# Patient Record
Sex: MALE | Race: WHITE | NOT HISPANIC OR LATINO | Employment: FULL TIME | URBAN - METROPOLITAN AREA
[De-identification: names, ages, dates, MRNs, and addresses within clinical notes are randomized per-mention and may not be internally consistent; named-entity substitution may affect disease eponyms.]

---

## 2024-01-20 ENCOUNTER — APPOINTMENT (EMERGENCY)
Dept: CT IMAGING | Facility: HOSPITAL | Age: 39
End: 2024-01-20

## 2024-01-20 ENCOUNTER — HOSPITAL ENCOUNTER (EMERGENCY)
Facility: HOSPITAL | Age: 39
Discharge: HOME | End: 2024-01-20
Attending: EMERGENCY MEDICINE

## 2024-01-20 VITALS
WEIGHT: 212.3 LBS | HEART RATE: 112 BPM | SYSTOLIC BLOOD PRESSURE: 151 MMHG | TEMPERATURE: 98.4 F | DIASTOLIC BLOOD PRESSURE: 84 MMHG | OXYGEN SATURATION: 95 % | RESPIRATION RATE: 20 BRPM

## 2024-01-20 DIAGNOSIS — K20.90 ESOPHAGITIS: ICD-10-CM

## 2024-01-20 DIAGNOSIS — K76.0 HEPATIC STEATOSIS: ICD-10-CM

## 2024-01-20 DIAGNOSIS — K44.9 HIATAL HERNIA: ICD-10-CM

## 2024-01-20 DIAGNOSIS — R00.0 TACHYCARDIA: ICD-10-CM

## 2024-01-20 DIAGNOSIS — R25.1 SHAKES: Primary | ICD-10-CM

## 2024-01-20 DIAGNOSIS — J18.9 PNEUMONIA: ICD-10-CM

## 2024-01-20 DIAGNOSIS — R79.89 ELEVATED LFTS: ICD-10-CM

## 2024-01-20 DIAGNOSIS — S32.010A COMPRESSION FRACTURE OF THORACOLUMBAR VERTEBRA (HCC): ICD-10-CM

## 2024-01-20 DIAGNOSIS — S22.080A COMPRESSION FRACTURE OF THORACOLUMBAR VERTEBRA (HCC): ICD-10-CM

## 2024-01-20 DIAGNOSIS — K80.20 GALL STONE: ICD-10-CM

## 2024-01-20 LAB
2HR DELTA HS TROPONIN: 0 NG/L
ALBUMIN SERPL BCP-MCNC: 4.3 G/DL (ref 3.5–5)
ALP SERPL-CCNC: 119 U/L (ref 34–104)
ALT SERPL W P-5'-P-CCNC: 173 U/L (ref 7–52)
AMPHETAMINES SERPL QL SCN: NEGATIVE
ANION GAP SERPL CALCULATED.3IONS-SCNC: 11 MMOL/L
AST SERPL W P-5'-P-CCNC: 174 U/L (ref 13–39)
ATRIAL RATE: 108 BPM
ATRIAL RATE: 122 BPM
BACTERIA UR QL AUTO: ABNORMAL /HPF
BARBITURATES UR QL: NEGATIVE
BASOPHILS # BLD AUTO: 0.05 THOUSANDS/ÂΜL (ref 0–0.1)
BASOPHILS NFR BLD AUTO: 1 % (ref 0–1)
BENZODIAZ UR QL: POSITIVE
BILIRUB SERPL-MCNC: 0.87 MG/DL (ref 0.2–1)
BILIRUB UR QL STRIP: ABNORMAL
BUN SERPL-MCNC: 6 MG/DL (ref 5–25)
CALCIUM SERPL-MCNC: 9.3 MG/DL (ref 8.4–10.2)
CARDIAC TROPONIN I PNL SERPL HS: 5 NG/L
CARDIAC TROPONIN I PNL SERPL HS: 5 NG/L
CHLORIDE SERPL-SCNC: 98 MMOL/L (ref 96–108)
CK SERPL-CCNC: 302 U/L (ref 39–308)
CLARITY UR: CLEAR
CO2 SERPL-SCNC: 24 MMOL/L (ref 21–32)
COCAINE UR QL: NEGATIVE
COLOR UR: ABNORMAL
CREAT SERPL-MCNC: 0.86 MG/DL (ref 0.6–1.3)
D DIMER PPP FEU-MCNC: 0.93 UG/ML FEU
EOSINOPHIL # BLD AUTO: 0.02 THOUSAND/ÂΜL (ref 0–0.61)
EOSINOPHIL NFR BLD AUTO: 0 % (ref 0–6)
ERYTHROCYTE [DISTWIDTH] IN BLOOD BY AUTOMATED COUNT: 14 % (ref 11.6–15.1)
ETHANOL SERPL-MCNC: <10 MG/DL
FLUAV RNA RESP QL NAA+PROBE: NEGATIVE
FLUBV RNA RESP QL NAA+PROBE: NEGATIVE
GFR SERPL CREATININE-BSD FRML MDRD: 109 ML/MIN/1.73SQ M
GLUCOSE SERPL-MCNC: 127 MG/DL (ref 65–140)
GLUCOSE UR STRIP-MCNC: NEGATIVE MG/DL
HCT VFR BLD AUTO: 45.6 % (ref 36.5–49.3)
HGB BLD-MCNC: 15.7 G/DL (ref 12–17)
HGB UR QL STRIP.AUTO: NEGATIVE
IMM GRANULOCYTES # BLD AUTO: 0.02 THOUSAND/UL (ref 0–0.2)
IMM GRANULOCYTES NFR BLD AUTO: 0 % (ref 0–2)
KETONES UR STRIP-MCNC: ABNORMAL MG/DL
LEUKOCYTE ESTERASE UR QL STRIP: NEGATIVE
LIPASE SERPL-CCNC: 34 U/L (ref 11–82)
LYMPHOCYTES # BLD AUTO: 0.7 THOUSANDS/ÂΜL (ref 0.6–4.47)
LYMPHOCYTES NFR BLD AUTO: 10 % (ref 14–44)
MCH RBC QN AUTO: 30.8 PG (ref 26.8–34.3)
MCHC RBC AUTO-ENTMCNC: 34.4 G/DL (ref 31.4–37.4)
MCV RBC AUTO: 90 FL (ref 82–98)
METHADONE UR QL: NEGATIVE
MONOCYTES # BLD AUTO: 1.04 THOUSAND/ÂΜL (ref 0.17–1.22)
MONOCYTES NFR BLD AUTO: 15 % (ref 4–12)
MUCOUS THREADS UR QL AUTO: ABNORMAL
NEUTROPHILS # BLD AUTO: 5.16 THOUSANDS/ÂΜL (ref 1.85–7.62)
NEUTS SEG NFR BLD AUTO: 74 % (ref 43–75)
NITRITE UR QL STRIP: NEGATIVE
NON-SQ EPI CELLS URNS QL MICRO: ABNORMAL /HPF
NRBC BLD AUTO-RTO: 0 /100 WBCS
OPIATES UR QL SCN: NEGATIVE
OXYCODONE+OXYMORPHONE UR QL SCN: NEGATIVE
P AXIS: 37 DEGREES
P AXIS: 39 DEGREES
PCP UR QL: NEGATIVE
PH UR STRIP.AUTO: 7.5 [PH] (ref 4.5–8)
PLATELET # BLD AUTO: 143 THOUSANDS/UL (ref 149–390)
PMV BLD AUTO: 11.6 FL (ref 8.9–12.7)
POTASSIUM SERPL-SCNC: 3.7 MMOL/L (ref 3.5–5.3)
PR INTERVAL: 158 MS
PR INTERVAL: 194 MS
PROT SERPL-MCNC: 7.8 G/DL (ref 6.4–8.4)
PROT UR STRIP-MCNC: ABNORMAL MG/DL
QRS AXIS: 13 DEGREES
QRS AXIS: 9 DEGREES
QRSD INTERVAL: 84 MS
QRSD INTERVAL: 92 MS
QT INTERVAL: 308 MS
QT INTERVAL: 358 MS
QTC INTERVAL: 438 MS
QTC INTERVAL: 479 MS
RBC # BLD AUTO: 5.09 MILLION/UL (ref 3.88–5.62)
RBC #/AREA URNS AUTO: ABNORMAL /HPF
RSV RNA RESP QL NAA+PROBE: NEGATIVE
SARS-COV-2 RNA RESP QL NAA+PROBE: NEGATIVE
SODIUM SERPL-SCNC: 133 MMOL/L (ref 135–147)
SP GR UR STRIP.AUTO: 1.01 (ref 1–1.03)
T WAVE AXIS: 52 DEGREES
T WAVE AXIS: 65 DEGREES
THC UR QL: NEGATIVE
TSH SERPL DL<=0.05 MIU/L-ACNC: 1.22 UIU/ML (ref 0.45–4.5)
UROBILINOGEN UR QL STRIP.AUTO: 2 E.U./DL
VENTRICULAR RATE: 108 BPM
VENTRICULAR RATE: 122 BPM
WBC # BLD AUTO: 6.99 THOUSAND/UL (ref 4.31–10.16)
WBC #/AREA URNS AUTO: ABNORMAL /HPF

## 2024-01-20 PROCEDURE — 85025 COMPLETE CBC W/AUTO DIFF WBC: CPT | Performed by: EMERGENCY MEDICINE

## 2024-01-20 PROCEDURE — 93005 ELECTROCARDIOGRAM TRACING: CPT

## 2024-01-20 PROCEDURE — 71275 CT ANGIOGRAPHY CHEST: CPT

## 2024-01-20 PROCEDURE — 96360 HYDRATION IV INFUSION INIT: CPT

## 2024-01-20 PROCEDURE — 81001 URINALYSIS AUTO W/SCOPE: CPT

## 2024-01-20 PROCEDURE — 36415 COLL VENOUS BLD VENIPUNCTURE: CPT | Performed by: EMERGENCY MEDICINE

## 2024-01-20 PROCEDURE — 99285 EMERGENCY DEPT VISIT HI MDM: CPT | Performed by: EMERGENCY MEDICINE

## 2024-01-20 PROCEDURE — 74177 CT ABD & PELVIS W/CONTRAST: CPT

## 2024-01-20 PROCEDURE — 80053 COMPREHEN METABOLIC PANEL: CPT | Performed by: EMERGENCY MEDICINE

## 2024-01-20 PROCEDURE — 80307 DRUG TEST PRSMV CHEM ANLYZR: CPT | Performed by: EMERGENCY MEDICINE

## 2024-01-20 PROCEDURE — 83690 ASSAY OF LIPASE: CPT | Performed by: EMERGENCY MEDICINE

## 2024-01-20 PROCEDURE — 99284 EMERGENCY DEPT VISIT MOD MDM: CPT

## 2024-01-20 PROCEDURE — 82077 ASSAY SPEC XCP UR&BREATH IA: CPT | Performed by: EMERGENCY MEDICINE

## 2024-01-20 PROCEDURE — 96361 HYDRATE IV INFUSION ADD-ON: CPT

## 2024-01-20 PROCEDURE — 0241U HB NFCT DS VIR RESP RNA 4 TRGT: CPT | Performed by: EMERGENCY MEDICINE

## 2024-01-20 PROCEDURE — 84443 ASSAY THYROID STIM HORMONE: CPT | Performed by: EMERGENCY MEDICINE

## 2024-01-20 PROCEDURE — 84484 ASSAY OF TROPONIN QUANT: CPT | Performed by: EMERGENCY MEDICINE

## 2024-01-20 PROCEDURE — 85379 FIBRIN DEGRADATION QUANT: CPT | Performed by: EMERGENCY MEDICINE

## 2024-01-20 PROCEDURE — 82550 ASSAY OF CK (CPK): CPT | Performed by: EMERGENCY MEDICINE

## 2024-01-20 RX ORDER — DOXYCYCLINE HYCLATE 100 MG/1
100 CAPSULE ORAL 2 TIMES DAILY
Qty: 19 CAPSULE | Refills: 0 | Status: SHIPPED | OUTPATIENT
Start: 2024-01-20 | End: 2024-01-30

## 2024-01-20 RX ORDER — DOXYCYCLINE HYCLATE 100 MG/1
100 CAPSULE ORAL ONCE
Status: COMPLETED | OUTPATIENT
Start: 2024-01-20 | End: 2024-01-20

## 2024-01-20 RX ORDER — FAMOTIDINE 20 MG/1
20 TABLET, FILM COATED ORAL 2 TIMES DAILY
Qty: 20 TABLET | Refills: 0 | Status: SHIPPED | OUTPATIENT
Start: 2024-01-20 | End: 2024-01-30

## 2024-01-20 RX ADMIN — IOHEXOL 85 ML: 350 INJECTION, SOLUTION INTRAVENOUS at 10:27

## 2024-01-20 RX ADMIN — SODIUM CHLORIDE 1000 ML: 0.9 INJECTION, SOLUTION INTRAVENOUS at 09:02

## 2024-01-20 RX ADMIN — DOXYCYCLINE HYCLATE 100 MG: 100 CAPSULE ORAL at 12:20

## 2024-01-20 RX ADMIN — IOHEXOL 100 ML: 350 INJECTION, SOLUTION INTRAVENOUS at 10:14

## 2024-01-20 NOTE — ED PROVIDER NOTES
"History  Chief Complaint   Patient presents with    Medical Problem     Pt reports uncontrollable shaking that started yesterday around 1500. Pt reports being unable to eat due to being unable to hold his utensils. Pt reports new onset of seizure 12/23. Pt also reports irregular heart rate since November. Pt reports chest pain and feeling like his \"heart was going to burst out of his chest last night. Took xanex last night, no relief       History provided by:  Patient   used: No    Medical Problem  Quality:  Shakes  Severity:  Moderate  Onset quality:  Gradual  Duration:  1 day  Timing:  Intermittent  Progression:  Waxing and waning  Chronicity:  Recurrent  Context:  Having generalized shakes since last night, had in past, was thought to be due to dehydration  Relieved by:  Nothing  Worsened by:  Nothing  Ineffective treatments:  None  Associated symptoms: no abdominal pain, no chest pain, no cough, no diarrhea, no fever, no headaches, no loss of consciousness, no nausea, no rash, no shortness of breath, no sore throat and no vomiting    Risk factors:  Alcohol abuse in past      None       History reviewed. No pertinent past medical history.    History reviewed. No pertinent surgical history.    History reviewed. No pertinent family history.  I have reviewed and agree with the history as documented.    E-Cigarette/Vaping    E-Cigarette Use Current Every Day User      E-Cigarette/Vaping Substances    Nicotine Yes      Social History     Tobacco Use    Smoking status: Never    Smokeless tobacco: Never   Vaping Use    Vaping status: Every Day    Substances: Nicotine   Substance Use Topics    Alcohol use: Not Currently    Drug use: Not Currently       Review of Systems   Constitutional:  Negative for chills and fever.   HENT:  Negative for facial swelling, sore throat and trouble swallowing.    Eyes:  Negative for pain and visual disturbance.   Respiratory:  Negative for cough, chest tightness and " shortness of breath.    Cardiovascular:  Negative for chest pain and leg swelling.   Gastrointestinal:  Negative for abdominal pain, diarrhea, nausea and vomiting.   Genitourinary:  Negative for dysuria and flank pain.   Musculoskeletal:  Negative for back pain, neck pain and neck stiffness.   Skin:  Negative for pallor and rash.   Allergic/Immunologic: Negative for environmental allergies and immunocompromised state.   Neurological:  Negative for dizziness, loss of consciousness and headaches.   Hematological:  Negative for adenopathy. Does not bruise/bleed easily.   Psychiatric/Behavioral:  Negative for agitation and behavioral problems.    All other systems reviewed and are negative.      Physical Exam  Physical Exam  Vitals and nursing note reviewed.   Constitutional:       General: He is not in acute distress.     Appearance: He is well-developed.   HENT:      Head: Normocephalic and atraumatic.   Eyes:      Extraocular Movements: Extraocular movements intact.   Cardiovascular:      Rate and Rhythm: Normal rate and regular rhythm.   Pulmonary:      Effort: Pulmonary effort is normal. No respiratory distress.   Abdominal:      Palpations: Abdomen is soft.      Tenderness: There is no abdominal tenderness. There is no guarding or rebound.   Musculoskeletal:         General: Normal range of motion.      Cervical back: Normal range of motion and neck supple.   Skin:     General: Skin is warm and dry.   Neurological:      General: No focal deficit present.      Mental Status: He is alert and oriented to person, place, and time.   Psychiatric:         Mood and Affect: Mood normal.         Behavior: Behavior normal.         Vital Signs  ED Triage Vitals   Temperature Pulse Respirations Blood Pressure SpO2   01/20/24 0831 01/20/24 0818 01/20/24 0818 01/20/24 0818 01/20/24 0818   98.4 °F (36.9 °C) (!) 139 22 170/100 96 %      Temp Source Heart Rate Source Patient Position - Orthostatic VS BP Location FiO2 (%)   01/20/24  0831 01/20/24 0818 01/20/24 0818 01/20/24 0818 --   Oral Monitor Lying Right arm       Pain Score       01/20/24 0818       6           Vitals:    01/20/24 0900 01/20/24 1000 01/20/24 1115 01/20/24 1200   BP: 163/94 154/84 141/88 151/84   Pulse: (!) 112 (!) 108 (!) 106 (!) 112   Patient Position - Orthostatic VS: Lying Lying Lying Lying         Visual Acuity      ED Medications  Medications   sodium chloride 0.9 % bolus 1,000 mL (0 mL Intravenous Stopped 1/20/24 1111)   sodium chloride 0.9 % bolus 1,000 mL (0 mL Intravenous Stopped 1/20/24 1111)   iohexol (OMNIPAQUE) 350 MG/ML injection (MULTI-DOSE) 100 mL (100 mL Intravenous Given 1/20/24 1014)   iohexol (OMNIPAQUE) 350 MG/ML injection (MULTI-DOSE) 85 mL (85 mL Intravenous Given 1/20/24 1027)   doxycycline hyclate (VIBRAMYCIN) capsule 100 mg (100 mg Oral Given 1/20/24 1220)       Diagnostic Studies  Results Reviewed       Procedure Component Value Units Date/Time    HS Troponin I 2hr [[id number]]  (Normal) Collected: 01/20/24 1037    Lab Status: Final result Specimen: Blood from Hand, Right Updated: 01/20/24 1110     hs TnI 2hr 5 ng/L      Delta 2hr hsTnI 0 ng/L     Lipase [[id number]]  (Normal) Collected: 01/20/24 1036    Lab Status: Final result Specimen: Blood from Hand, Right Updated: 01/20/24 1102     Lipase 34 u/L     Ethanol [[id number]]  (Normal) Collected: 01/20/24 1036    Lab Status: Final result Specimen: Blood from Hand, Right Updated: 01/20/24 1102     Ethanol Lvl <10 mg/dL     Rapid drug screen, urine [[id number]]  (Abnormal) Collected: 01/20/24 0907    Lab Status: Final result Specimen: Urine, Clean Catch Updated: 01/20/24 0938     Amph/Meth UR Negative     Barbiturate Ur Negative     Benzodiazepine Urine Positive     Cocaine Urine Negative     Methadone Urine Negative     Opiate Urine Negative     PCP Ur Negative     THC Urine Negative     Oxycodone Urine Negative    Narrative:      Presumptive report. If requested, specimen will be sent to reference  lab for confirmation.  FOR MEDICAL PURPOSES ONLY.   IF CONFIRMATION NEEDED PLEASE CONTACT THE LAB WITHIN 5 DAYS.    Drug Screen Cutoff Levels:  AMPHETAMINE/METHAMPHETAMINES  1000 ng/mL  BARBITURATES     200 ng/mL  BENZODIAZEPINES     200 ng/mL  COCAINE      300 ng/mL  METHADONE      300 ng/mL  OPIATES      300 ng/mL  PHENCYCLIDINE     25 ng/mL  THC       50 ng/mL  OXYCODONE      100 ng/mL    TSH, 3rd generation with Free T4 reflex [[id number]]  (Normal) Collected: 01/20/24 0851    Lab Status: Final result Specimen: Blood from Arm, Left Updated: 01/20/24 0938     TSH 3RD GENERATON 1.219 uIU/mL     FLU/RSV/COVID - if FLU/RSV clinically relevant [[id number]]  (Normal) Collected: 01/20/24 0850    Lab Status: Final result Specimen: Nares from Nose Updated: 01/20/24 0935     SARS-CoV-2 Negative     INFLUENZA A PCR Negative     INFLUENZA B PCR Negative     RSV PCR Negative    Narrative:      FOR PEDIATRIC PATIENTS - copy/paste COVID Guidelines URL to browser: [email or web address]    SARS-CoV-2 assay is a Nucleic Acid Amplification assay intended for the  qualitative detection of nucleic acid from SARS-CoV-2 in nasopharyngeal  swabs. Results are for the presumptive identification of SARS-CoV-2 RNA.    Positive results are indicative of infection with SARS-CoV-2, the virus  causing COVID-19, but do not rule out bacterial infection or co-infection  with other viruses. Laboratories within the [place] and its  territories are required to report all positive results to the appropriate  public health authorities. Negative results do not preclude SARS-CoV-2  infection and should not be used as the sole basis for treatment or other  patient management decisions. Negative results must be combined with  clinical observations, patient history, and epidemiological information.  This test has not been FDA cleared or approved.    This test has been authorized by FDA under an  Emergency Use Authorization  (EUA). This test is only authorized for the duration of time the  declaration that circumstances exist justifying the authorization of the  emergency use of an in vitro diagnostic tests for detection of SARS-CoV-2  virus and/or diagnosis of COVID-19 infection under section 564(b)(1) of  the Act, 21 U.S.C. 360bbb-3(b)(1), unless the authorization is terminated  or revoked sooner. The test has been validated but independent review by FDA  and CLIA is pending.    Test performed using [employer]: This RT-PCR assay targets N2,  a region unique to SARS-CoV-2. A conserved region in the E-gene was chosen  for pan-Sarbecovirus detection which includes SARS-CoV-2.    According to CMS-2020-01-R, this platform meets the definition of high-throughput technology.    D-Dimer [[id number]]  (Abnormal) Collected: 01/20/24 0851    Lab Status: Final result Specimen: Blood from Arm, Left Updated: 01/20/24 0929     D-Dimer, Quant 0.93 ug/ml FEU     Urine Microscopic [[id number]]  (Abnormal) Collected: 01/20/24 0908    Lab Status: Final result Specimen: Urine, Clean Catch Updated: 01/20/24 0929     RBC, UA 1-2 /hpf      WBC, UA 1-2 /hpf      Epithelial Cells Occasional /hpf      Bacteria, UA Occasional /hpf      MUCUS THREADS Occasional    Comprehensive metabolic panel [[id number]]  (Abnormal) Collected: 01/20/24 0851    Lab Status: Final result Specimen: Blood from Arm, Left Updated: 01/20/24 0921     Sodium 133 mmol/L      Potassium 3.7 mmol/L      Chloride 98 mmol/L      CO2 24 mmol/L      ANION GAP 11 mmol/L      BUN 6 mg/dL      Creatinine 0.86 mg/dL      Glucose 127 mg/dL      Calcium 9.3 mg/dL       U/L       U/L      Alkaline Phosphatase 119 U/L      Total Protein 7.8 g/dL      Albumin 4.3 g/dL      Total Bilirubin 0.87 mg/dL      eGFR 109 ml/min/1.73sq m     Narrative:      National Kidney Disease Foundation guidelines for Chronic Kidney Disease (CKD):     Stage 1 with normal or high  GFR (GFR > 90 mL/min/1.73 square meters)    Stage 2 Mild CKD (GFR = 60-89 mL/min/1.73 square meters)    Stage 3A Moderate CKD (GFR = 45-59 mL/min/1.73 square meters)    Stage 3B Moderate CKD (GFR = 30-44 mL/min/1.73 square meters)    Stage 4 Severe CKD (GFR = 15-29 mL/min/1.73 square meters)    Stage 5 End Stage CKD (GFR <15 mL/min/1.73 square meters)  Note: GFR calculation is accurate only with a steady state creatinine    CK [[id number]]  (Normal) Collected: 01/20/24 0851    Lab Status: Final result Specimen: Blood from Arm, Left Updated: 01/20/24 0921     Total  U/L     HS Troponin 0hr (reflex protocol) [[id number]]  (Normal) Collected: 01/20/24 0851    Lab Status: Final result Specimen: Blood from Arm, Left Updated: 01/20/24 0921     hs TnI 0hr 5 ng/L     Urine Macroscopic, POC [[id number]]  (Abnormal) Collected: 01/20/24 0908    Lab Status: Final result Specimen: Urine Updated: 01/20/24 0910     Color, UA Orange     Clarity, UA Clear     pH, UA 7.5     Leukocytes, UA Negative     Nitrite, UA Negative     Protein,  (2+) mg/dl      Glucose, UA Negative mg/dl      Ketones, UA Trace mg/dl      Urobilinogen, UA 2.0 E.U./dl      Bilirubin, UA Small     Occult Blood, UA Negative     Specific Gravity, UA 1.015    Narrative:      CLINITEK RESULT    CBC and differential [[id number]]  (Abnormal) Collected: 01/20/24 0851    Lab Status: Final result Specimen: Blood from Arm, Left Updated: 01/20/24 0903     WBC 6.99 Thousand/uL      RBC 5.09 Million/uL      Hemoglobin 15.7 g/dL      Hematocrit 45.6 %      MCV 90 fL      MCH 30.8 pg      MCHC 34.4 g/dL      RDW 14.0 %      MPV 11.6 fL      Platelets 143 Thousands/uL      nRBC 0 /100 WBCs      Neutrophils Relative 74 %      Immat GRANS % 0 %      Lymphocytes Relative 10 %      Monocytes Relative 15 %      Eosinophils Relative 0 %      Basophils Relative 1 %      Neutrophils Absolute 5.16 Thousands/µL      Immature Grans Absolute 0.02 Thousand/uL      Lymphocytes  Absolute 0.70 Thousands/µL      Monocytes Absolute 1.04 Thousand/µL      Eosinophils Absolute 0.02 Thousand/µL      Basophils Absolute 0.05 Thousands/µL                    PE Study with CT Abdomen and Pelvis with contrast   Final Result by Arturo Bledsoe MD (01/20 1041)      Small patchy nodular groundglass densities in the medial right middle lobe, nonspecific but may be infectious/inflammatory in etiology.      No CT evidence of pulmonary embolism.      Hepatic steatosis.      Possible tiny gallstone.      Tiny hiatal hernia with mild wall thickening of the mid to distal esophagus concerning for esophagitis.      Mild bladder wall thickening. Correlation for cystitis advised.      Minimal compression fracture deformities of the superior endplates of T11, T12, and L1, age indeterminate.                           Workstation performed: [employer]                    Procedures  ECG 12 Lead Documentation Only    Date/Time: 1/20/2024 9:46 AM    Performed by: Dean Bruner MD  Authorized by: Dean Bruner MD    Indications / Diagnosis:  Shakes, Tachycardia  ECG reviewed by me, the ED Provider: yes    Patient location:  ED  Interpretation:     Interpretation: normal    Rate:     ECG rate:  116    ECG rate assessment: tachycardic    Rhythm:     Rhythm: sinus tachycardia    Ectopy:     Ectopy: none    QRS:     QRS axis:  Normal  Conduction:     Conduction: normal    ST segments:     ST segments:  Normal  T waves:     T waves: normal             ED Course  ED Course as of 01/20/24 1245   Sat Jan 20, 2024   0928 WBC: 6.99   0928 Hemoglobin: 15.7   0928 Platelet Count(!): 143   0928 Sodium(!): 133   0928 Potassium: 3.7   0929 BUN: 6   0929 Creatinine: 0.86   0929 AST(!): 174   0929 ALT(!): 173   0929 Alkaline Phosphatase(!): 119   0929 Total CK: 302  Labs reviewed.   1058 PE Study with CT Abdomen and Pelvis with contrast  IMPRESSION:     Small patchy nodular groundglass densities in the medial right middle lobe, nonspecific  but may be infectious/inflammatory in etiology.     No CT evidence of pulmonary embolism.     Hepatic steatosis.     Possible tiny gallstone.     Tiny hiatal hernia with mild wall thickening of the mid to distal esophagus concerning for esophagitis.     Mild bladder wall thickening. Correlation for cystitis advised.     Minimal compression fracture deformities of the superior endplates of T11, T12, and L1, age indeterminate.     1114 hs TnI 2hr: 5   1114 Delta 2hr hsTnI: 0  Delta trop 0 noted.   1220 Patient and significant other informed in detail about the workup results, labs, CT results, will treat for pneumonia, will give dose of doxycycline in the emergency, start on Pepcid for possible esophagitis, other findings discussed in detail for follow-up as outpatient as noted on discharge papers.             HEART Risk Score      Flowsheet Row Most Recent Value   Heart Score Risk Calculator    History 1 Filed at: 01/20/2024 1220   ECG 0 Filed at: 01/20/2024 1220   Age 0 Filed at: 01/20/2024 1220   Risk Factors 1 Filed at: 01/20/2024 1220   Troponin 0 Filed at: 01/20/2024 1220   HEART Score 2 Filed at: 01/20/2024 1220                          SBIRT 20yo+      Flowsheet Row Most Recent Value   Initial Alcohol Screen: US AUDIT-C     1. How often do you have a drink containing alcohol? 0 Filed at: 01/20/2024 0911   2. How many drinks containing alcohol do you have on a typical day you are drinking?  0 Filed at: 01/20/2024 0911   3a. Male UNDER 65: How often do you have five or more drinks on one occasion? 0 Filed at: 01/20/2024 0911   3b. FEMALE Any Age, or MALE 65+: How often do you have 4 or more drinks on one occassion? 0 Filed at: 01/20/2024 0911   Audit-C Score 0 Filed at: 01/20/2024 0911   [place]: How many times in the past year have you...    Used an illegal drug or used a prescription medication for non-medical reasons? Never Filed at: 01/20/2024 0911                      Medical Decision Making  Patient is a  38-year-old male, history of alcohol abuse in the past, comes in with complaints of generalized shakes since last night, states he had similar symptoms in the past was thought to be from dehydration, no symptoms consistent with seizure activity, no tongue bite, no urine incontinence, denies history of alcohol withdrawal seizure, no fever, cough, headache, does report nonspecific chest pain due to palpitations feels his heart racing, no abdominal pain, no vomiting; no prior history of DVT PE.  On exam, patient is conscious, alert, vital signs noted for tachycardia, afebrile, no acute distress, lungs clear, heart sounds regular, abdomen soft nontender, patient has generalized shaking of bilateral upper extremities, nonfocal neuroexam otherwise.  Impression: Nonspecific shakes, possible electrolyte imbalance, dehydration, we will check labs, EKG, give IV fluids.    Problems Addressed:  Pneumonia: acute illness or injury  Shakes: acute illness or injury  Tachycardia: acute illness or injury    Amount and/or Complexity of Data Reviewed  Labs: ordered. Decision-making details documented in ED Course.  Radiology: ordered. Decision-making details documented in ED Course.  ECG/medicine tests: ordered and independent interpretation performed. Decision-making details documented in ED Course.    Risk  Prescription drug management.             Disposition  Final diagnoses:   Shakes   Elevated LFTs   Hepatic steatosis   Gall stone   Pneumonia   Esophagitis   Hiatal hernia   Compression fracture of thoracolumbar vertebra (HCC)   Tachycardia     Time reflects when diagnosis was documented in both MDM as applicable and the Disposition within this note       Time User Action Codes Description Comment    1/20/2024  9:50 AM Alam, Dean Add [R25.1] Shakes     1/20/2024 12:13 PM Alam, Dean Add [R79.89] Elevated LFTs     1/20/2024 12:13 PM Alam, Dean Add [K76.0] Hepatic steatosis     1/20/2024 12:13 PM Alam, Dean Add [K80.20]  Gall stone     1/20/2024 12:13 PM MarryJas faithDean Add [J18.9] Pneumonia     1/20/2024 12:15 PM Alavianney, Dean Add [K20.90] Esophagitis     1/20/2024 12:15 PM Alam, Dean Add [K44.9] Hiatal hernia     1/20/2024 12:17 PM Alam, Dean Add [S22.080A,  S32.010A] Compression fracture of thoracolumbar vertebra (HCC)     1/20/2024 12:45 PM Alavianney, Dean Add [R00.0] Tachycardia           ED Disposition       ED Disposition   Discharge    Condition   Stable    Date/Time   Sat Jan 20, 2024 1213    Comment   Mickey Gilbert discharge to home/self care.                   Follow-up Information    None         Patient's Medications   Discharge Prescriptions    DOXYCYCLINE HYCLATE (VIBRAMYCIN) 100 MG CAPSULE    Take 1 capsule (100 mg total) by mouth 2 (two) times a day for 19 doses       Start Date: 1/20/2024 End Date: 1/30/2024       Order Dose: 100 mg       Quantity: 19 capsule    Refills: 0    FAMOTIDINE (PEPCID) 20 MG TABLET    Take 1 tablet (20 mg total) by mouth 2 (two) times a day for 10 days       Start Date: 1/20/2024 End Date: 1/30/2024       Order Dose: 20 mg       Quantity: 20 tablet    Refills: 0       No discharge procedures on file.    PDMP Review       None            ED Provider  Electronically Signed by             Dean Bruner MD  01/20/24 9552

## 2024-01-20 NOTE — DISCHARGE INSTRUCTIONS
Please follow-up with your Primary Doctor about CT findings discussed with you as below:    IMPRESSION:     Small patchy nodular groundglass densities in the medial right middle lobe, nonspecific but may be infectious/inflammatory in etiology.     No CT evidence of pulmonary embolism.     Hepatic steatosis.     Possible tiny gallstone.     Tiny hiatal hernia with mild wall thickening of the mid to distal esophagus concerning for esophagitis.     Mild bladder wall thickening. Correlation for cystitis advised.     Minimal compression fracture deformities of the superior endplates of T11, T12, and L1, age indeterminate.